# Patient Record
Sex: MALE | Race: WHITE | Employment: UNEMPLOYED | URBAN - METROPOLITAN AREA
[De-identification: names, ages, dates, MRNs, and addresses within clinical notes are randomized per-mention and may not be internally consistent; named-entity substitution may affect disease eponyms.]

---

## 2020-08-30 ENCOUNTER — HOSPITAL ENCOUNTER (EMERGENCY)
Age: 35
Discharge: HOME HEALTH | End: 2020-08-31
Attending: EMERGENCY MEDICINE
Payer: COMMERCIAL

## 2020-08-30 VITALS
BODY MASS INDEX: 23.7 KG/M2 | RESPIRATION RATE: 16 BRPM | SYSTOLIC BLOOD PRESSURE: 135 MMHG | TEMPERATURE: 98.2 F | OXYGEN SATURATION: 95 % | HEIGHT: 72 IN | DIASTOLIC BLOOD PRESSURE: 91 MMHG | WEIGHT: 175 LBS | HEART RATE: 92 BPM

## 2020-08-30 PROCEDURE — 99283 EMERGENCY DEPT VISIT LOW MDM: CPT

## 2020-08-31 NOTE — ED NOTES
Bed: H-02  Expected date:   Expected time:   Means of arrival:   Comments:  EMS     [facility]- [address] [place] [address], RN  08/30/20 4672

## 2020-08-31 NOTE — ED TRIAGE NOTES
Patient arrived in the ED EMS. Patient reportedly overdosed on an unknown substance but was given 4 mg of Narcan total nasally. Patient was reported to be unresponsive when EMS arrived at the scene. After recieveing Narcan woke up immediately. Patient arrived to the ED alert and oriented X3. Patient VS WNL.

## 2020-08-31 NOTE — ED PROVIDER NOTES
had difficulty ambulating. After observation; Patient was discharged in good condition with stable vitals. FINAL IMPRESSION      1. Accidental overdose of heroin, initial encounter [facility])          [address] Discharge - Pending Orders Complete 08/30/2020 11:51:30 PM      PATIENT REFERRED TO:  Shara Hinton MD  [facility]. [address] [place] [address]  [phone]    Call   Call your primary care physician on Monday to discuss emergency department evaluation and treatment strategies to quit using opiates      DISCHARGE MEDICATIONS:  New Prescriptions    No medications on file              (Please note that portions of this note were completed with a voice recognition program.  Efforts were made to edit the dictations but occasionally words are mis-transcribed. )      Jordana Cortes MD (electronically signed)  Attending Emergency Physician          Jordana Cortes MD  08/30/20 3725

## 2020-12-24 ENCOUNTER — HOSPITAL ENCOUNTER (OUTPATIENT)
Age: 35
Setting detail: SPECIMEN
Discharge: HOME | End: 2020-12-24
Payer: COMMERCIAL

## 2020-12-24 PROCEDURE — U0002 COVID-19 LAB TEST NON-CDC: HCPCS

## 2020-12-25 LAB
SARS-COV-2: NOT DETECTED
SOURCE: NORMAL

## 2021-04-09 ENCOUNTER — HOSPITAL ENCOUNTER (OUTPATIENT)
Age: 36
Discharge: HOME | End: 2021-04-09
Payer: COMMERCIAL

## 2021-04-09 LAB
ALBUMIN SERPL-MCNC: 4.8 GM/DL (ref 3.4–5)
ALP BLD-CCNC: 93 IU/L (ref 40–129)
ALT SERPL-CCNC: 57 U/L (ref 10–40)
AST SERPL-CCNC: 31 IU/L (ref 15–37)
BILIRUB SERPL-MCNC: 0.3 MG/DL (ref 0–1)
BILIRUBIN DIRECT: 0.2 MG/DL (ref 0–0.3)
BILIRUBIN, INDIRECT: 0.1 MG/DL (ref 0–0.7)
HEPATITIS B SURFACE ANTIGEN: NON REACTIVE
TOTAL PROTEIN: 7.5 GM/DL (ref 6.4–8.2)

## 2021-04-09 PROCEDURE — 80076 HEPATIC FUNCTION PANEL: CPT

## 2021-04-09 PROCEDURE — 87389 HIV-1 AG W/HIV-1&-2 AB AG IA: CPT

## 2021-04-09 PROCEDURE — 86803 HEPATITIS C AB TEST: CPT

## 2021-04-09 PROCEDURE — 86708 HEPATITIS A ANTIBODY: CPT

## 2021-04-09 PROCEDURE — 36415 COLL VENOUS BLD VENIPUNCTURE: CPT

## 2021-04-09 PROCEDURE — 87340 HEPATITIS B SURFACE AG IA: CPT

## 2021-04-10 LAB
HAV AB SERPL IA-ACNC: POSITIVE
HEPATITIS C ANTIBODY: ABNORMAL

## 2021-04-12 LAB — HIV SCREEN: NON REACTIVE

## 2024-09-05 ENCOUNTER — APPOINTMENT (OUTPATIENT)
Dept: CT IMAGING | Age: 39
End: 2024-09-05
Payer: COMMERCIAL

## 2024-09-05 ENCOUNTER — HOSPITAL ENCOUNTER (EMERGENCY)
Age: 39
Discharge: HOME | End: 2024-09-05
Attending: EMERGENCY MEDICINE
Payer: COMMERCIAL

## 2024-09-05 VITALS
OXYGEN SATURATION: 96 % | SYSTOLIC BLOOD PRESSURE: 134 MMHG | TEMPERATURE: 97.4 F | HEART RATE: 85 BPM | DIASTOLIC BLOOD PRESSURE: 89 MMHG | RESPIRATION RATE: 16 BRPM | BODY MASS INDEX: 33.18 KG/M2 | HEIGHT: 72 IN | WEIGHT: 245 LBS

## 2024-09-05 DIAGNOSIS — R10.9 ABDOMINAL PAIN, UNSPECIFIED ABDOMINAL LOCATION: ICD-10-CM

## 2024-09-05 DIAGNOSIS — K42.9 UMBILICAL HERNIA WITHOUT OBSTRUCTION AND WITHOUT GANGRENE: Primary | ICD-10-CM

## 2024-09-05 LAB
ALBUMIN SERPL-MCNC: 4.7 GM/DL (ref 3.4–5)
ALP BLD-CCNC: 111 IU/L (ref 40–129)
ALT SERPL-CCNC: 56 U/L (ref 10–40)
AMPHETAMINES: NEGATIVE
ANION GAP SERPL CALCULATED.3IONS-SCNC: 18 MMOL/L (ref 7–16)
AST SERPL-CCNC: 49 IU/L (ref 15–37)
BARBITURATE SCREEN URINE: NEGATIVE
BASOPHILS ABSOLUTE: 0.1 K/CU MM
BASOPHILS RELATIVE PERCENT: 0.6 % (ref 0–1)
BENZODIAZEPINE SCREEN, URINE: NEGATIVE
BILIRUB SERPL-MCNC: 0.2 MG/DL (ref 0–1)
BILIRUBIN, URINE: NEGATIVE MG/DL
BLOOD, URINE: NEGATIVE
BUN SERPL-MCNC: 8 MG/DL (ref 6–23)
CALCIUM SERPL-MCNC: 9.8 MG/DL (ref 8.3–10.6)
CANNABINOID SCREEN URINE: ABNORMAL
CHLORIDE BLD-SCNC: 99 MMOL/L (ref 99–110)
CLARITY, UA: CLEAR
CO2: 23 MMOL/L (ref 21–32)
COCAINE METABOLITE: NEGATIVE
COLOR, UA: YELLOW
COMMENT UA: ABNORMAL
CREAT SERPL-MCNC: 0.8 MG/DL (ref 0.9–1.3)
DIFFERENTIAL TYPE: ABNORMAL
EOSINOPHILS ABSOLUTE: 0.2 K/CU MM
EOSINOPHILS RELATIVE PERCENT: 2.5 % (ref 0–3)
FENTANYL URINE: NEGATIVE
GFR, ESTIMATED: >90 ML/MIN/1.73M2
GLUCOSE SERPL-MCNC: 103 MG/DL (ref 70–99)
GLUCOSE URINE: NEGATIVE MG/DL
HCT VFR BLD CALC: 43 % (ref 42–52)
HEMOGLOBIN: 14.9 GM/DL (ref 13.5–18)
IMMATURE NEUTROPHIL %: 0.3 % (ref 0–0.43)
KETONES, URINE: ABNORMAL MG/DL
LEUKOCYTE ESTERASE, URINE: NEGATIVE
LIPASE: 16 IU/L (ref 13–60)
LYMPHOCYTES ABSOLUTE: 2.9 K/CU MM
LYMPHOCYTES RELATIVE PERCENT: 32.6 % (ref 24–44)
MCH RBC QN AUTO: 31.9 PG (ref 27–31)
MCHC RBC AUTO-ENTMCNC: 34.7 % (ref 32–36)
MCV RBC AUTO: 92.1 FL (ref 78–100)
MONOCYTES ABSOLUTE: 0.7 K/CU MM
MONOCYTES RELATIVE PERCENT: 7.6 % (ref 0–4)
NEUTROPHILS ABSOLUTE: 4.9 K/CU MM
NEUTROPHILS RELATIVE PERCENT: 56.4 % (ref 36–66)
NITRITE URINE, QUANTITATIVE: NEGATIVE
OPIATES, URINE: NEGATIVE
OXYCODONE: NEGATIVE
PDW BLD-RTO: 13.2 % (ref 11.7–14.9)
PH, URINE: 6.5 (ref 5–8)
PLATELET # BLD: 218 K/CU MM (ref 140–440)
PMV BLD AUTO: 11 FL (ref 7.5–11.1)
POTASSIUM SERPL-SCNC: 4.3 MMOL/L (ref 3.5–5.1)
PROTEIN UA: NEGATIVE MG/DL
RBC # BLD: 4.67 M/CU MM (ref 4.6–6.2)
SODIUM BLD-SCNC: 140 MMOL/L (ref 135–145)
SPECIFIC GRAVITY UA: 1.02 (ref 1–1.03)
TOTAL IMMATURE NEUTOROPHIL: 0.03 K/CU MM
TOTAL PROTEIN: 7.8 GM/DL (ref 6.4–8.2)
UROBILINOGEN, URINE: 0.2 MG/DL (ref 0.2–1)
WBC # BLD: 8.7 K/CU MM (ref 4–10.5)

## 2024-09-05 PROCEDURE — 81003 URINALYSIS AUTO W/O SCOPE: CPT

## 2024-09-05 PROCEDURE — 6360000004 HC RX CONTRAST MEDICATION: Performed by: EMERGENCY MEDICINE

## 2024-09-05 PROCEDURE — 80053 COMPREHEN METABOLIC PANEL: CPT

## 2024-09-05 PROCEDURE — 80307 DRUG TEST PRSMV CHEM ANLYZR: CPT

## 2024-09-05 PROCEDURE — 74177 CT ABD & PELVIS W/CONTRAST: CPT

## 2024-09-05 PROCEDURE — 6370000000 HC RX 637 (ALT 250 FOR IP): Performed by: EMERGENCY MEDICINE

## 2024-09-05 PROCEDURE — 85025 COMPLETE CBC W/AUTO DIFF WBC: CPT

## 2024-09-05 PROCEDURE — 83690 ASSAY OF LIPASE: CPT

## 2024-09-05 PROCEDURE — 99285 EMERGENCY DEPT VISIT HI MDM: CPT

## 2024-09-05 PROCEDURE — 6360000002 HC RX W HCPCS: Performed by: EMERGENCY MEDICINE

## 2024-09-05 PROCEDURE — 2580000003 HC RX 258: Performed by: EMERGENCY MEDICINE

## 2024-09-05 PROCEDURE — 96374 THER/PROPH/DIAG INJ IV PUSH: CPT

## 2024-09-05 RX ORDER — METHADONE HYDROCHLORIDE 40 MG/1
40 TABLET ORAL EVERY 4 HOURS PRN
COMMUNITY

## 2024-09-05 RX ORDER — 0.9 % SODIUM CHLORIDE 0.9 %
1000 INTRAVENOUS SOLUTION INTRAVENOUS ONCE
Status: COMPLETED | OUTPATIENT
Start: 2024-09-05 | End: 2024-09-05

## 2024-09-05 RX ORDER — ONDANSETRON 2 MG/ML
4 INJECTION INTRAMUSCULAR; INTRAVENOUS EVERY 30 MIN PRN
Status: DISCONTINUED | OUTPATIENT
Start: 2024-09-05 | End: 2024-09-05 | Stop reason: HOSPADM

## 2024-09-05 RX ORDER — KETOROLAC TROMETHAMINE 30 MG/ML
30 INJECTION, SOLUTION INTRAMUSCULAR; INTRAVENOUS ONCE
Status: COMPLETED | OUTPATIENT
Start: 2024-09-05 | End: 2024-09-05

## 2024-09-05 RX ORDER — ONDANSETRON 4 MG/1
4 TABLET, ORALLY DISINTEGRATING ORAL 3 TIMES DAILY PRN
Qty: 21 TABLET | Refills: 0 | Status: SHIPPED | OUTPATIENT
Start: 2024-09-05

## 2024-09-05 RX ORDER — DICYCLOMINE HCL 20 MG
20 TABLET ORAL 4 TIMES DAILY
Qty: 40 TABLET | Refills: 0 | Status: SHIPPED | OUTPATIENT
Start: 2024-09-05

## 2024-09-05 RX ORDER — DICYCLOMINE HCL 20 MG
20 TABLET ORAL ONCE
Status: COMPLETED | OUTPATIENT
Start: 2024-09-05 | End: 2024-09-05

## 2024-09-05 RX ORDER — IOPAMIDOL 755 MG/ML
75 INJECTION, SOLUTION INTRAVASCULAR
Status: COMPLETED | OUTPATIENT
Start: 2024-09-05 | End: 2024-09-05

## 2024-09-05 RX ORDER — ACETAMINOPHEN 325 MG/1
650 TABLET ORAL EVERY 6 HOURS PRN
Qty: 120 TABLET | Refills: 3 | Status: SHIPPED | OUTPATIENT
Start: 2024-09-05

## 2024-09-05 RX ADMIN — IOPAMIDOL 75 ML: 755 INJECTION, SOLUTION INTRAVENOUS at 20:39

## 2024-09-05 RX ADMIN — DICYCLOMINE HYDROCHLORIDE 20 MG: 20 TABLET ORAL at 19:50

## 2024-09-05 RX ADMIN — KETOROLAC TROMETHAMINE 30 MG: 30 INJECTION, SOLUTION INTRAMUSCULAR; INTRAVENOUS at 19:48

## 2024-09-05 RX ADMIN — SODIUM CHLORIDE 1000 ML: 9 INJECTION, SOLUTION INTRAVENOUS at 19:48

## 2024-09-05 ASSESSMENT — PAIN DESCRIPTION - LOCATION
LOCATION: ABDOMEN

## 2024-09-05 ASSESSMENT — PAIN DESCRIPTION - PAIN TYPE
TYPE: ACUTE PAIN

## 2024-09-05 ASSESSMENT — PAIN SCALES - GENERAL
PAINLEVEL_OUTOF10: 2
PAINLEVEL_OUTOF10: 6
PAINLEVEL_OUTOF10: 6

## 2024-09-05 ASSESSMENT — ENCOUNTER SYMPTOMS
EYES NEGATIVE: 1
ALLERGIC/IMMUNOLOGIC NEGATIVE: 1
ABDOMINAL PAIN: 1
NAUSEA: 1
RESPIRATORY NEGATIVE: 1

## 2024-09-05 ASSESSMENT — PAIN - FUNCTIONAL ASSESSMENT
PAIN_FUNCTIONAL_ASSESSMENT: ACTIVITIES ARE NOT PREVENTED
PAIN_FUNCTIONAL_ASSESSMENT: 0-10
PAIN_FUNCTIONAL_ASSESSMENT: ACTIVITIES ARE NOT PREVENTED
PAIN_FUNCTIONAL_ASSESSMENT: ACTIVITIES ARE NOT PREVENTED

## 2024-09-05 ASSESSMENT — PAIN DESCRIPTION - ORIENTATION
ORIENTATION: MID

## 2024-09-05 ASSESSMENT — PAIN DESCRIPTION - FREQUENCY
FREQUENCY: CONTINUOUS

## 2024-09-05 ASSESSMENT — PAIN DESCRIPTION - DESCRIPTORS
DESCRIPTORS: ACHING;SHARP

## 2024-09-05 NOTE — ED PROVIDER NOTES
[facility]      TRIAGE CHIEF COMPLAINT:   Abdominal Pain (Pt states that he has had mid abdominal pain for for past 2-3 days. States that he was lifting something and now has a bubble near his belly button. )      [place]:  Rafy Castle is a 39 y.o. male that presents with complaint of periumbilical abdominal pain and possible umbilical hernia.  Patient states that he has had this for last 2 3 days after lifting something.  He thinks he has a hernia.  He was told back in 2018 in custodial that he had a umbilical hernia he states since the last 2 days has gotten worse after he tried lifting something.  No fevers some slight nausea no vomiting no chest pain shortness of breath no urine complaints of diarrhea constipation.  He does use marijuana does use methadone.  No other questions..    REVIEW OF SYSTEMS:  At least 10 systems reviewed and otherwise acutely negative except as in the [place].    Review of Systems   Constitutional: Negative.    HENT: Negative.     Eyes: Negative.    Respiratory: Negative.     Cardiovascular: Negative.    Gastrointestinal:  Positive for abdominal pain and nausea.   Endocrine: Negative.    Genitourinary: Negative.    Musculoskeletal: Negative.    Skin: Negative.    Allergic/Immunologic: Negative.    Neurological: Negative.    Hematological: Negative.    Psychiatric/Behavioral: Negative.     All other systems reviewed and are negative.      Past Medical History:   Diagnosis Date    Anxiety     Asthma     Chronic back pain     Migraine      Past Surgical History:   Procedure Laterality Date    WISDOM TOOTH EXTRACTION       History reviewed. No pertinent family history.  Social History     Socioeconomic History    Marital status: Single     Spouse name: Not on file    Number of children: Not on file    Years of education: Not on file    Highest education level: Not on file   Occupational History    Not on file   Tobacco Use    Smoking status: Former     Current

## 2024-09-11 ENCOUNTER — OFFICE VISIT (OUTPATIENT)
Dept: SURGERY | Age: 39
End: 2024-09-11

## 2024-09-11 VITALS
BODY MASS INDEX: 34.58 KG/M2 | WEIGHT: 255.3 LBS | OXYGEN SATURATION: 95 % | DIASTOLIC BLOOD PRESSURE: 88 MMHG | HEART RATE: 92 BPM | HEIGHT: 72 IN | SYSTOLIC BLOOD PRESSURE: 122 MMHG

## 2024-09-11 DIAGNOSIS — N64.4 BREAST PAIN: Primary | ICD-10-CM

## 2024-09-11 DIAGNOSIS — K42.9 UMBILICAL HERNIA WITHOUT OBSTRUCTION AND WITHOUT GANGRENE: ICD-10-CM

## 2024-10-01 ENCOUNTER — APPOINTMENT (OUTPATIENT)
Dept: ULTRASOUND IMAGING | Age: 39
End: 2024-10-01
Attending: SURGERY
Payer: COMMERCIAL

## 2024-10-01 ENCOUNTER — HOSPITAL ENCOUNTER (OUTPATIENT)
Dept: WOMENS IMAGING | Age: 39
Discharge: HOME | End: 2024-10-01
Attending: SURGERY
Payer: COMMERCIAL

## 2024-10-01 VITALS — BODY MASS INDEX: 33.86 KG/M2 | HEIGHT: 72 IN | WEIGHT: 250 LBS

## 2024-10-01 DIAGNOSIS — N64.4 BREAST PAIN: ICD-10-CM

## 2024-10-01 DIAGNOSIS — N64.4 BREAST PAIN: Primary | ICD-10-CM

## 2024-10-01 PROCEDURE — 77066 DX MAMMO INCL CAD BI: CPT

## 2024-10-09 ENCOUNTER — OFFICE VISIT (OUTPATIENT)
Dept: SURGERY | Age: 39
End: 2024-10-09

## 2024-10-09 VITALS
BODY MASS INDEX: 33.86 KG/M2 | HEART RATE: 82 BPM | DIASTOLIC BLOOD PRESSURE: 78 MMHG | WEIGHT: 250 LBS | SYSTOLIC BLOOD PRESSURE: 126 MMHG | HEIGHT: 72 IN

## 2024-10-09 DIAGNOSIS — K42.9 UMBILICAL HERNIA WITHOUT OBSTRUCTION AND WITHOUT GANGRENE: ICD-10-CM

## 2024-10-09 DIAGNOSIS — N62 GYNECOMASTIA: Primary | ICD-10-CM

## 2024-10-09 NOTE — PROGRESS NOTES
tobacco: Not on file   Vaping Use    Vaping status: Never Used   Substance and Sexual Activity    Alcohol use: No    Drug use: Yes     Types: Marijuana (Weed)     Comment: daily    Sexual activity: Yes   Other Topics Concern    Not on file   Social History Narrative    Not on file     Social Determinants of Health     Financial Resource Strain: Not on file   Food Insecurity: Not on file   Transportation Needs: Not on file   Physical Activity: Not on file   Stress: Not on file   Social Connections: Not on file   Intimate Partner Violence: Not on file   Housing Stability: Not on file       OBJECTIVE:    General: A&O x3  Respiratory: Chest rise equal bilaterally  CV: Regular rate and rhythm  Abdomen: Soft, nontender, nondistended, no rebound or guarding.  Umbilical hernia        ASSESSMENT:    1. Gynecomastia    2. Umbilical hernia without obstruction and without gangrene          PLAN:    Imaging reviewed in regards to gynecomastia.  Patient does not want a pursue surgery at this time.    Umbilical hernia-discussed risks and benefits and alternatives to repair and he would like to proceed with robotic repair.  He has a follow-up appointment scheduled as he would like to pursue surgery later in the year.        No orders of the defined types were placed in this encounter.       No orders of the defined types were placed in this encounter.       Follow Up: No follow-ups on file.    Shar Ho,

## 2024-11-27 ENCOUNTER — OFFICE VISIT (OUTPATIENT)
Dept: SURGERY | Age: 39
End: 2024-11-27
Payer: COMMERCIAL

## 2024-11-27 VITALS — SYSTOLIC BLOOD PRESSURE: 136 MMHG | HEART RATE: 84 BPM | OXYGEN SATURATION: 96 % | DIASTOLIC BLOOD PRESSURE: 84 MMHG

## 2024-11-27 DIAGNOSIS — K42.0 UMBILICAL HERNIA WITH OBSTRUCTION, WITHOUT GANGRENE: Primary | ICD-10-CM

## 2024-11-27 PROCEDURE — G8417 CALC BMI ABV UP PARAM F/U: HCPCS | Performed by: SURGERY

## 2024-11-27 PROCEDURE — 1036F TOBACCO NON-USER: CPT | Performed by: SURGERY

## 2024-11-27 PROCEDURE — G8427 DOCREV CUR MEDS BY ELIG CLIN: HCPCS | Performed by: SURGERY

## 2024-11-27 PROCEDURE — 99214 OFFICE O/P EST MOD 30 MIN: CPT | Performed by: SURGERY

## 2024-11-27 PROCEDURE — G8484 FLU IMMUNIZE NO ADMIN: HCPCS | Performed by: SURGERY

## 2024-11-27 RX ORDER — SODIUM CHLORIDE 0.9 % (FLUSH) 0.9 %
5-40 SYRINGE (ML) INJECTION EVERY 12 HOURS SCHEDULED
OUTPATIENT
Start: 2024-11-27

## 2024-11-27 RX ORDER — SODIUM CHLORIDE 9 MG/ML
INJECTION, SOLUTION INTRAVENOUS PRN
OUTPATIENT
Start: 2024-11-27

## 2024-11-27 RX ORDER — SODIUM CHLORIDE 9 MG/ML
INJECTION, SOLUTION INTRAVENOUS CONTINUOUS
OUTPATIENT
Start: 2024-11-27

## 2024-11-27 RX ORDER — SODIUM CHLORIDE 0.9 % (FLUSH) 0.9 %
5-40 SYRINGE (ML) INJECTION PRN
OUTPATIENT
Start: 2024-11-27

## 2024-11-27 NOTE — PROGRESS NOTES
repair with mesh.    Orders Placed This Encounter   Procedures    Initiate PAT Protocol        No orders of the defined types were placed in this encounter.       Follow Up:  No follow-ups on file.      Shar Ho DO

## 2024-12-04 ENCOUNTER — PREP FOR PROCEDURE (OUTPATIENT)
Dept: SURGERY | Age: 39
End: 2024-12-04

## 2024-12-04 DIAGNOSIS — K42.0 UMBILICAL HERNIA WITH OBSTRUCTION, WITHOUT GANGRENE: ICD-10-CM

## 2024-12-05 ENCOUNTER — TELEPHONE (OUTPATIENT)
Dept: SURGERY | Age: 39
End: 2024-12-05

## 2024-12-05 NOTE — TELEPHONE ENCOUNTER
SPOKE TO Rafy Castle REGARDING SURGERY (ROBOTIC UMBILICAL HERNIA) SCHEDULED @ [facility]. NOTIFIED OF DATES, TIMES AND LOCATION    PHONE ASSESSMENT   SURGERY -1/2 @ 7:30  P/O - 1/15 @ 9    NPO AFTER MIDNIGHT    HOLD BLOOD THINNERS - NA

## 2024-12-19 NOTE — PROGRESS NOTES
Surgery @ [facility] on 1/3/2025 you will be called 12/31/24 with times    NOTHING TO EAT OR DRINK AFTER MIDNIGHT DAY OF SURGERY    1. Enter thru the hospital main entrance on day of surgery, check in at the Information Desk. If you arrive prior to 6:00am, enter thru the ER entrance.    2. Follow the directions as prescribed by the doctor for your procedure and medications.         Morning of surgery take: No medications          Stop vitamins, supplements and NSAIDS:  12/26/24    3. Check with your Doctor regarding stopping blood thinners and follow their instructions.    4. Do not smoke, vape or use chewing tobacco morning of surgery. Do not drink any alcoholic beverages 24 hours prior to surgery.       This includes NA Beer. No street drugs 7 days prior to surgery.    5. If you have dentures, contacts of glasses they will be removed before going to the OR; please bring a case.    6. Please bring picture ID, insurance card, paperwork from the doctor’s office (H & P, Consent, & card for implantable devices).    7. Take a shower with an antibacterial soap the night before surgery and the morning of surgery. Do not put anything on your skin      After your morning shower.    8. You will need a responsible adult to drive you home and check on you after surgery.

## 2024-12-31 ENCOUNTER — ANESTHESIA EVENT (OUTPATIENT)
Dept: OPERATING ROOM | Age: 39
End: 2024-12-31
Payer: COMMERCIAL

## 2024-12-31 NOTE — PROGRESS NOTES
Notified patient surgery @ [facility] on 1/2/25 @ 0730, arrival 0600. NPO status and morning medications reviewed. Understanding verbalized.

## 2024-12-31 NOTE — ANESTHESIA PRE PROCEDURE
Department of Anesthesiology  Preprocedure Note       Name:  Rafy Castle   Age:  39 y.o.  :  1985                                          MRN:  [id number]         Date:  2024      Surgeon: Surgeon(s):  Shar Ho DO    Procedure: Procedure(s):  HERNIA UMBILICAL REPAIR LAPAROSCOPIC ROBOTIC    Medications prior to admission:   Prior to Admission medications    Medication Sig Start Date End Date Taking? Authorizing Provider   methadone (METHADOSE) 40 MG disintegrating tablet Take 1 tablet by mouth every 4 hours as needed for Pain.   Yes Provider, MD Rochelle   dicyclomine (BENTYL) 20 MG tablet Take 1 tablet by mouth 4 times daily 24  Yes Hossein Alexander DO   acetaminophen (TYLENOL) 325 MG tablet Take 2 tablets by mouth every 6 hours as needed for Pain 24  Yes Hossein Alexander DO   ondansetron (ZOFRAN-ODT) 4 MG disintegrating tablet Take 1 tablet by mouth 3 times daily as needed for Nausea or Vomiting  Patient not taking: Reported on 2024   Hossein Alexander DO   naproxen (NAPROSYN) 500 MG tablet Take 1 tablet by mouth 2 times daily as needed for Pain 17  Geoff Fields PA-C   acetaminophen (APAP EXTRA STRENGTH) 500 MG tablet Take 1 tablet by mouth every 6 hours as needed for Pain  Patient not taking: Reported on 2024   Geoff Fields PA-C       Current medications:    No current facility-administered medications for this encounter.     Current Outpatient Medications   Medication Sig Dispense Refill   • methadone (METHADOSE) 40 MG disintegrating tablet Take 1 tablet by mouth every 4 hours as needed for Pain.     • dicyclomine (BENTYL) 20 MG tablet Take 1 tablet by mouth 4 times daily 40 tablet 0   • acetaminophen (TYLENOL) 325 MG tablet Take 2 tablets by mouth every 6 hours as needed for Pain 120 tablet 3   • ondansetron (ZOFRAN-ODT) 4 MG disintegrating tablet Take 1 tablet by mouth 3 times daily as needed for Nausea or Vomiting

## 2025-01-02 ENCOUNTER — ANESTHESIA (OUTPATIENT)
Dept: OPERATING ROOM | Age: 40
End: 2025-01-02
Payer: COMMERCIAL

## 2025-01-02 ENCOUNTER — HOSPITAL ENCOUNTER (OUTPATIENT)
Age: 40
Setting detail: OUTPATIENT SURGERY
Discharge: HOME | End: 2025-01-02
Attending: SURGERY | Admitting: SURGERY
Payer: COMMERCIAL

## 2025-01-02 VITALS
BODY MASS INDEX: 33.86 KG/M2 | OXYGEN SATURATION: 92 % | WEIGHT: 250 LBS | DIASTOLIC BLOOD PRESSURE: 77 MMHG | HEIGHT: 72 IN | SYSTOLIC BLOOD PRESSURE: 120 MMHG | HEART RATE: 72 BPM | TEMPERATURE: 98 F | RESPIRATION RATE: 16 BRPM

## 2025-01-02 DIAGNOSIS — K42.0 UMBILICAL HERNIA WITH OBSTRUCTION, WITHOUT GANGRENE: Primary | ICD-10-CM

## 2025-01-02 PROCEDURE — 99024 POSTOP FOLLOW-UP VISIT: CPT | Performed by: SURGERY

## 2025-01-02 PROCEDURE — 6360000002 HC RX W HCPCS: Performed by: ANESTHESIOLOGY

## 2025-01-02 PROCEDURE — 3600000019 HC SURGERY ROBOT ADDTL 15MIN: Performed by: SURGERY

## 2025-01-02 PROCEDURE — 7100000010 HC PHASE II RECOVERY - FIRST 15 MIN: Performed by: SURGERY

## 2025-01-02 PROCEDURE — 49594 RPR AA HRN 1ST 3-10 NCR/STRN: CPT | Performed by: SURGERY

## 2025-01-02 PROCEDURE — 6370000000 HC RX 637 (ALT 250 FOR IP): Performed by: ANESTHESIOLOGY

## 2025-01-02 PROCEDURE — 6360000002 HC RX W HCPCS

## 2025-01-02 PROCEDURE — 2500000003 HC RX 250 WO HCPCS: Performed by: SURGERY

## 2025-01-02 PROCEDURE — 6360000002 HC RX W HCPCS: Performed by: SURGERY

## 2025-01-02 PROCEDURE — 3700000001 HC ADD 15 MINUTES (ANESTHESIA): Performed by: SURGERY

## 2025-01-02 PROCEDURE — C1781 MESH (IMPLANTABLE): HCPCS | Performed by: SURGERY

## 2025-01-02 PROCEDURE — 3600000009 HC SURGERY ROBOT BASE: Performed by: SURGERY

## 2025-01-02 PROCEDURE — 2580000003 HC RX 258

## 2025-01-02 PROCEDURE — 2500000003 HC RX 250 WO HCPCS

## 2025-01-02 PROCEDURE — 7100000001 HC PACU RECOVERY - ADDTL 15 MIN: Performed by: SURGERY

## 2025-01-02 PROCEDURE — 7100000011 HC PHASE II RECOVERY - ADDTL 15 MIN: Performed by: SURGERY

## 2025-01-02 PROCEDURE — 7100000000 HC PACU RECOVERY - FIRST 15 MIN: Performed by: SURGERY

## 2025-01-02 PROCEDURE — 2709999900 HC NON-CHARGEABLE SUPPLY: Performed by: SURGERY

## 2025-01-02 PROCEDURE — 3700000000 HC ANESTHESIA ATTENDED CARE: Performed by: SURGERY

## 2025-01-02 DEVICE — MESH HERN W15XH15CM POLYPR NONABSORBABLE SYN SQ PROL: Type: IMPLANTABLE DEVICE | Site: ABDOMEN | Status: FUNCTIONAL

## 2025-01-02 RX ORDER — LABETALOL HYDROCHLORIDE 5 MG/ML
10 INJECTION, SOLUTION INTRAVENOUS
Status: DISCONTINUED | OUTPATIENT
Start: 2025-01-02 | End: 2025-01-02 | Stop reason: HOSPADM

## 2025-01-02 RX ORDER — METHOCARBAMOL 100 MG/ML
500 INJECTION, SOLUTION INTRAMUSCULAR; INTRAVENOUS EVERY 8 HOURS
Status: DISCONTINUED | OUTPATIENT
Start: 2025-01-02 | End: 2025-01-02 | Stop reason: HOSPADM

## 2025-01-02 RX ORDER — SODIUM CHLORIDE 0.9 % (FLUSH) 0.9 %
5-40 SYRINGE (ML) INJECTION EVERY 12 HOURS SCHEDULED
Status: DISCONTINUED | OUTPATIENT
Start: 2025-01-02 | End: 2025-01-02 | Stop reason: HOSPADM

## 2025-01-02 RX ORDER — HYDROCODONE BITARTRATE AND ACETAMINOPHEN 5; 325 MG/1; MG/1
1 TABLET ORAL EVERY 6 HOURS PRN
Qty: 20 TABLET | Refills: 0 | Status: SHIPPED | OUTPATIENT
Start: 2025-01-02 | End: 2025-01-07

## 2025-01-02 RX ORDER — KETAMINE HCL 50MG/ML(1)
SYRINGE (ML) INTRAVENOUS
Status: DISCONTINUED | OUTPATIENT
Start: 2025-01-02 | End: 2025-01-02 | Stop reason: SDUPTHER

## 2025-01-02 RX ORDER — SODIUM CHLORIDE 0.9 % (FLUSH) 0.9 %
5-40 SYRINGE (ML) INJECTION PRN
Status: CANCELLED | OUTPATIENT
Start: 2025-01-02

## 2025-01-02 RX ORDER — KETOROLAC TROMETHAMINE 30 MG/ML
INJECTION, SOLUTION INTRAMUSCULAR; INTRAVENOUS
Status: DISCONTINUED | OUTPATIENT
Start: 2025-01-02 | End: 2025-01-02 | Stop reason: SDUPTHER

## 2025-01-02 RX ORDER — SODIUM CHLORIDE 0.9 % (FLUSH) 0.9 %
5-40 SYRINGE (ML) INJECTION PRN
Status: DISCONTINUED | OUTPATIENT
Start: 2025-01-02 | End: 2025-01-02 | Stop reason: HOSPADM

## 2025-01-02 RX ORDER — SODIUM CHLORIDE 0.9 % (FLUSH) 0.9 %
5-40 SYRINGE (ML) INJECTION EVERY 12 HOURS SCHEDULED
Status: CANCELLED | OUTPATIENT
Start: 2025-01-02

## 2025-01-02 RX ORDER — BUPIVACAINE HYDROCHLORIDE 5 MG/ML
INJECTION, SOLUTION EPIDURAL; INTRACAUDAL
Status: COMPLETED | OUTPATIENT
Start: 2025-01-02 | End: 2025-01-02

## 2025-01-02 RX ORDER — FENTANYL CITRATE 50 UG/ML
50 INJECTION, SOLUTION INTRAMUSCULAR; INTRAVENOUS EVERY 5 MIN PRN
Status: DISCONTINUED | OUTPATIENT
Start: 2025-01-02 | End: 2025-01-02 | Stop reason: HOSPADM

## 2025-01-02 RX ORDER — ONDANSETRON 2 MG/ML
INJECTION INTRAMUSCULAR; INTRAVENOUS
Status: DISCONTINUED | OUTPATIENT
Start: 2025-01-02 | End: 2025-01-02 | Stop reason: SDUPTHER

## 2025-01-02 RX ORDER — SODIUM CHLORIDE 9 MG/ML
INJECTION, SOLUTION INTRAVENOUS PRN
Status: CANCELLED | OUTPATIENT
Start: 2025-01-02

## 2025-01-02 RX ORDER — LIDOCAINE HYDROCHLORIDE 20 MG/ML
INJECTION, SOLUTION INTRAVENOUS
Status: DISCONTINUED | OUTPATIENT
Start: 2025-01-02 | End: 2025-01-02 | Stop reason: SDUPTHER

## 2025-01-02 RX ORDER — ROCURONIUM BROMIDE 10 MG/ML
INJECTION, SOLUTION INTRAVENOUS
Status: DISCONTINUED | OUTPATIENT
Start: 2025-01-02 | End: 2025-01-02 | Stop reason: SDUPTHER

## 2025-01-02 RX ORDER — HYDRALAZINE HYDROCHLORIDE 20 MG/ML
10 INJECTION INTRAMUSCULAR; INTRAVENOUS
Status: DISCONTINUED | OUTPATIENT
Start: 2025-01-02 | End: 2025-01-02 | Stop reason: HOSPADM

## 2025-01-02 RX ORDER — PROPOFOL 10 MG/ML
INJECTION, EMULSION INTRAVENOUS
Status: DISCONTINUED | OUTPATIENT
Start: 2025-01-02 | End: 2025-01-02 | Stop reason: SDUPTHER

## 2025-01-02 RX ORDER — NALOXONE HYDROCHLORIDE 0.4 MG/ML
INJECTION, SOLUTION INTRAMUSCULAR; INTRAVENOUS; SUBCUTANEOUS PRN
Status: DISCONTINUED | OUTPATIENT
Start: 2025-01-02 | End: 2025-01-02 | Stop reason: HOSPADM

## 2025-01-02 RX ORDER — DEXAMETHASONE SODIUM PHOSPHATE 4 MG/ML
INJECTION, SOLUTION INTRA-ARTICULAR; INTRALESIONAL; INTRAMUSCULAR; INTRAVENOUS; SOFT TISSUE
Status: DISCONTINUED | OUTPATIENT
Start: 2025-01-02 | End: 2025-01-02 | Stop reason: SDUPTHER

## 2025-01-02 RX ORDER — ACETAMINOPHEN 500 MG
1000 TABLET ORAL ONCE
Status: COMPLETED | OUTPATIENT
Start: 2025-01-02 | End: 2025-01-02

## 2025-01-02 RX ORDER — SODIUM CHLORIDE, SODIUM LACTATE, POTASSIUM CHLORIDE, CALCIUM CHLORIDE 600; 310; 30; 20 MG/100ML; MG/100ML; MG/100ML; MG/100ML
INJECTION, SOLUTION INTRAVENOUS
Status: DISCONTINUED | OUTPATIENT
Start: 2025-01-02 | End: 2025-01-02 | Stop reason: SDUPTHER

## 2025-01-02 RX ORDER — ONDANSETRON 4 MG/1
4 TABLET, ORALLY DISINTEGRATING ORAL 3 TIMES DAILY PRN
Qty: 21 TABLET | Refills: 0 | Status: SHIPPED | OUTPATIENT
Start: 2025-01-02

## 2025-01-02 RX ORDER — LORAZEPAM 2 MG/ML
0.5 INJECTION INTRAMUSCULAR
Status: DISCONTINUED | OUTPATIENT
Start: 2025-01-02 | End: 2025-01-02 | Stop reason: HOSPADM

## 2025-01-02 RX ORDER — KETOROLAC TROMETHAMINE 30 MG/ML
30 INJECTION, SOLUTION INTRAMUSCULAR; INTRAVENOUS ONCE
Status: COMPLETED | OUTPATIENT
Start: 2025-01-02 | End: 2025-01-02

## 2025-01-02 RX ORDER — ONDANSETRON 2 MG/ML
4 INJECTION INTRAMUSCULAR; INTRAVENOUS
Status: COMPLETED | OUTPATIENT
Start: 2025-01-02 | End: 2025-01-02

## 2025-01-02 RX ORDER — DROPERIDOL 2.5 MG/ML
0.62 INJECTION, SOLUTION INTRAMUSCULAR; INTRAVENOUS
Status: DISCONTINUED | OUTPATIENT
Start: 2025-01-02 | End: 2025-01-02 | Stop reason: HOSPADM

## 2025-01-02 RX ORDER — OXYCODONE HYDROCHLORIDE 5 MG/1
5 TABLET ORAL
Status: COMPLETED | OUTPATIENT
Start: 2025-01-02 | End: 2025-01-02

## 2025-01-02 RX ORDER — MIDAZOLAM HYDROCHLORIDE 1 MG/ML
INJECTION, SOLUTION INTRAMUSCULAR; INTRAVENOUS
Status: DISCONTINUED | OUTPATIENT
Start: 2025-01-02 | End: 2025-01-02 | Stop reason: SDUPTHER

## 2025-01-02 RX ORDER — DEXMEDETOMIDINE HYDROCHLORIDE 100 UG/ML
INJECTION, SOLUTION INTRAVENOUS
Status: DISCONTINUED | OUTPATIENT
Start: 2025-01-02 | End: 2025-01-02 | Stop reason: SDUPTHER

## 2025-01-02 RX ORDER — SODIUM CHLORIDE 9 MG/ML
INJECTION, SOLUTION INTRAVENOUS CONTINUOUS
Status: DISCONTINUED | OUTPATIENT
Start: 2025-01-02 | End: 2025-01-02 | Stop reason: HOSPADM

## 2025-01-02 RX ORDER — SODIUM CHLORIDE 9 MG/ML
INJECTION, SOLUTION INTRAVENOUS PRN
Status: DISCONTINUED | OUTPATIENT
Start: 2025-01-02 | End: 2025-01-02 | Stop reason: HOSPADM

## 2025-01-02 RX ORDER — MEPERIDINE HYDROCHLORIDE 25 MG/ML
12.5 INJECTION INTRAMUSCULAR; INTRAVENOUS; SUBCUTANEOUS EVERY 5 MIN PRN
Status: DISCONTINUED | OUTPATIENT
Start: 2025-01-02 | End: 2025-01-02 | Stop reason: HOSPADM

## 2025-01-02 RX ADMIN — DEXAMETHASONE SODIUM PHOSPHATE 8 MG: 4 INJECTION, SOLUTION INTRAMUSCULAR; INTRAVENOUS at 07:58

## 2025-01-02 RX ADMIN — Medication 25 MG: at 07:49

## 2025-01-02 RX ADMIN — OXYCODONE HYDROCHLORIDE 5 MG: 5 TABLET ORAL at 10:49

## 2025-01-02 RX ADMIN — DEXMEDETOMIDINE 10 MCG: 100 INJECTION, SOLUTION INTRAVENOUS at 08:14

## 2025-01-02 RX ADMIN — ROCURONIUM BROMIDE 10 MG: 10 INJECTION, SOLUTION INTRAVENOUS at 08:35

## 2025-01-02 RX ADMIN — Medication 25 MG: at 07:59

## 2025-01-02 RX ADMIN — ONDANSETRON 4 MG: 2 INJECTION INTRAMUSCULAR; INTRAVENOUS at 07:58

## 2025-01-02 RX ADMIN — KETOROLAC TROMETHAMINE 30 MG: 30 INJECTION, SOLUTION INTRAMUSCULAR; INTRAVENOUS at 10:07

## 2025-01-02 RX ADMIN — ROCURONIUM BROMIDE 20 MG: 10 INJECTION, SOLUTION INTRAVENOUS at 08:11

## 2025-01-02 RX ADMIN — DEXMEDETOMIDINE 10 MCG: 100 INJECTION, SOLUTION INTRAVENOUS at 08:35

## 2025-01-02 RX ADMIN — HYDROMORPHONE HYDROCHLORIDE 0.5 MG: 1 INJECTION, SOLUTION INTRAMUSCULAR; INTRAVENOUS; SUBCUTANEOUS at 10:21

## 2025-01-02 RX ADMIN — SODIUM CHLORIDE, POTASSIUM CHLORIDE, SODIUM LACTATE AND CALCIUM CHLORIDE: 600; 310; 30; 20 INJECTION, SOLUTION INTRAVENOUS at 07:45

## 2025-01-02 RX ADMIN — DEXMEDETOMIDINE 10 MCG: 100 INJECTION, SOLUTION INTRAVENOUS at 09:20

## 2025-01-02 RX ADMIN — SODIUM CHLORIDE, POTASSIUM CHLORIDE, SODIUM LACTATE AND CALCIUM CHLORIDE: 600; 310; 30; 20 INJECTION, SOLUTION INTRAVENOUS at 09:16

## 2025-01-02 RX ADMIN — METHOCARBAMOL 500 MG: 100 INJECTION INTRAMUSCULAR; INTRAVENOUS at 10:07

## 2025-01-02 RX ADMIN — KETOROLAC TROMETHAMINE 30 MG: 30 INJECTION, SOLUTION INTRAMUSCULAR; INTRAVENOUS at 09:17

## 2025-01-02 RX ADMIN — CEFAZOLIN 2000 MG: 2 INJECTION, POWDER, FOR SOLUTION INTRAMUSCULAR; INTRAVENOUS at 07:59

## 2025-01-02 RX ADMIN — LIDOCAINE HYDROCHLORIDE 100 MG: 20 INJECTION, SOLUTION INTRAVENOUS at 07:52

## 2025-01-02 RX ADMIN — SUGAMMADEX 200 MG: 100 INJECTION, SOLUTION INTRAVENOUS at 09:19

## 2025-01-02 RX ADMIN — ACETAMINOPHEN 1000 MG: 500 TABLET ORAL at 07:21

## 2025-01-02 RX ADMIN — DEXMEDETOMIDINE 10 MCG: 100 INJECTION, SOLUTION INTRAVENOUS at 08:10

## 2025-01-02 RX ADMIN — HYDROMORPHONE HYDROCHLORIDE 1 MG: 1 INJECTION, SOLUTION INTRAMUSCULAR; INTRAVENOUS; SUBCUTANEOUS at 08:15

## 2025-01-02 RX ADMIN — ROCURONIUM BROMIDE 50 MG: 10 INJECTION, SOLUTION INTRAVENOUS at 07:52

## 2025-01-02 RX ADMIN — ONDANSETRON 4 MG: 2 INJECTION INTRAMUSCULAR; INTRAVENOUS at 10:07

## 2025-01-02 RX ADMIN — PROPOFOL 200 MG: 10 INJECTION, EMULSION INTRAVENOUS at 07:52

## 2025-01-02 RX ADMIN — MIDAZOLAM 2 MG: 1 INJECTION INTRAMUSCULAR; INTRAVENOUS at 07:45

## 2025-01-02 ASSESSMENT — ENCOUNTER SYMPTOMS
VOMITING: 0
ABDOMINAL DISTENTION: 0
CHOKING: 0
NAUSEA: 0
BLOOD IN STOOL: 0
ABDOMINAL PAIN: 0
TROUBLE SWALLOWING: 0
BACK PAIN: 0
SORE THROAT: 0
SHORTNESS OF BREATH: 0
STRIDOR: 0
COLOR CHANGE: 0
COUGH: 0

## 2025-01-02 ASSESSMENT — PAIN - FUNCTIONAL ASSESSMENT
PAIN_FUNCTIONAL_ASSESSMENT: ACTIVITIES ARE NOT PREVENTED
PAIN_FUNCTIONAL_ASSESSMENT: PREVENTS OR INTERFERES SOME ACTIVE ACTIVITIES AND ADLS
PAIN_FUNCTIONAL_ASSESSMENT: ACTIVITIES ARE NOT PREVENTED
PAIN_FUNCTIONAL_ASSESSMENT: PREVENTS OR INTERFERES SOME ACTIVE ACTIVITIES AND ADLS
PAIN_FUNCTIONAL_ASSESSMENT: PREVENTS OR INTERFERES SOME ACTIVE ACTIVITIES AND ADLS
PAIN_FUNCTIONAL_ASSESSMENT: 0-10
PAIN_FUNCTIONAL_ASSESSMENT: 0-10

## 2025-01-02 ASSESSMENT — PAIN DESCRIPTION - DESCRIPTORS
DESCRIPTORS: DISCOMFORT
DESCRIPTORS: ACHING;PRESSURE
DESCRIPTORS: ACHING;PRESSURE
DESCRIPTORS: DISCOMFORT
DESCRIPTORS: ACHING;PRESSURE
DESCRIPTORS: DISCOMFORT
DESCRIPTORS: ACHING

## 2025-01-02 ASSESSMENT — PAIN DESCRIPTION - FREQUENCY
FREQUENCY: CONTINUOUS

## 2025-01-02 ASSESSMENT — PAIN DESCRIPTION - ONSET
ONSET: ON-GOING

## 2025-01-02 ASSESSMENT — PAIN DESCRIPTION - ORIENTATION
ORIENTATION: MID

## 2025-01-02 ASSESSMENT — PAIN DESCRIPTION - LOCATION
LOCATION: ABDOMEN

## 2025-01-02 ASSESSMENT — PAIN DESCRIPTION - PAIN TYPE
TYPE: SURGICAL PAIN

## 2025-01-02 ASSESSMENT — PAIN SCALES - GENERAL
PAINLEVEL_OUTOF10: 5
PAINLEVEL_OUTOF10: 5
PAINLEVEL_OUTOF10: 7
PAINLEVEL_OUTOF10: 5
PAINLEVEL_OUTOF10: 6

## 2025-01-02 NOTE — ANESTHESIA POSTPROCEDURE EVALUATION
Department of Anesthesiology  Postprocedure Note    Patient: Rafy Castle  MRN: [id number]  YOB: 1985  Date of evaluation: 1/2/2025    Procedure Summary       Date: 01/02/25 Room / Location: [address]    Anesthesia Start: 0745 Anesthesia Stop: 0941    Procedure: HERNIA UMBILICAL REPAIR LAPAROSCOPIC ROBOTIC Diagnosis:       Umbilical hernia with obstruction, without gangrene      (Umbilical hernia with obstruction, without gangrene [K42.0])    Surgeons: Shar Ho DO Responsible Provider: Yandel Martinez MD    Anesthesia Type: General ASA Status: 2            Anesthesia Type: General    Nery Phase I: Nery Score: 10    Nery Phase II:      Anesthesia Post Evaluation    Patient location during evaluation: PACU  Patient participation: complete - patient participated  Level of consciousness: sleepy but conscious  Airway patency: patent  Nausea & Vomiting: no nausea and no vomiting  Cardiovascular status: hemodynamically stable  Respiratory status: acceptable, nasal cannula and spontaneous ventilation  Hydration status: stable  Pain management: adequate    No notable events documented.

## 2025-01-02 NOTE — DISCHARGE INSTRUCTIONS
Patient Discharge Instructions  Dr. Shar Ho  [address] [facility]  [address]  [place] [id number]  [phone]      Discharge Date:  1/2/2025    Discharged To:  Home      RESUME ACTIVITY:      BATHING:   OK to shower but no bath tub or submerging incision under water.    Incisions:    You have glue over your incisions, which will come off on its own in 1-2 weeks.  Keep wound dry and clean.  May shower as instructed above.    Dressing:    Wear abdominal binder while out of bed for 3 to 4 weeks after surgery.    DRIVING:   3-5 days. No driving until off narcotic pain medications and walking comfortably.    RETURN TO WORK: when cleared after your follow up office visit.    WALKING:    As tolerated.     STAIRS:    As tolerated.    LIFTING:   No lifting greater than 20 pounds for 6 weeks following surgery.    DIET:    Garden diet on day of surgery then regular diet.    Take stool softeners such as milk of magnesia or MiraLAX as needed to avoid constipation.    SPECIAL INSTRUCTIONS:     If you use a CPAP at home, continue to use it as normal.    Call the office at [phone]  if you have a fever greater than or equal to 101 F or if your incision becomes red, tender, or has drainage of pus.      If follow up appointment was not given to you, call the Surgical Clinic at [phone] for follow up appointment with Dr. Ho in:  1-2 weeks.

## 2025-01-02 NOTE — PROGRESS NOTES
0941- pt received from OR. Monitors placed and alarms on. Report received from Alfred CHIANG. Pt drowsy but arouses to name being called.  0958- pt resting comfortably with eyes closed. Arouses to name being called.  1007- pt medicated for complaints of pain and nausea.  1021- pt medicated for complaints of pain.  1035- pt resting comfortably. States pain is becoming tolerable. Denies any nausea.  1040- pt transported to [facility]  by RN and bedside report given to Sigifredo PADGETT.

## 2025-01-02 NOTE — H&P
SUBJECTIVE:  HPI:     Here fore umbilical hernia repair.     Past Surgical History:   Procedure Laterality Date    HAND DEBRIDEMENT  2016    washout    WISDOM TOOTH EXTRACTION       Past Medical History:   Diagnosis Date    Anxiety     Asthma     Chronic back pain     Migraine     Tick bite 2024    found a tick that was on him long enough to get blood. took it to health dept. waiting for results     Family History   Problem Relation Age of Onset    Breast Cancer Mother 64    Heart Disease Father     Hypertension Father     Cancer Maternal Grandmother         Lung    Heart Disease Maternal Grandfather     Diabetes Maternal Grandfather     Heart Surgery Maternal Grandfather     Dementia Paternal Grandmother     Diabetes Paternal Grandfather     Prostate Cancer Paternal Grandfather 89    Breast Cancer Maternal Aunt 70    Breast Cancer Maternal Aunt     Breast Cancer Paternal Aunt 55    Ovarian Cancer Neg Hx      Social History     Socioeconomic History    Marital status: Single     Spouse name: Not on file    Number of children: Not on file    Years of education: Not on file    Highest education level: Not on file   Occupational History    Not on file   Tobacco Use    Smoking status: Former     Current packs/day: 0.00     Average packs/day: 1 pack/day for 20.9 years (20.9 ttl pk-yrs)     Types: Cigarettes     Start date:      Quit date: 2023     Years since quittin.1    Smokeless tobacco: Not on file   Vaping Use    Vaping status: Never Used   Substance and Sexual Activity    Alcohol use: Yes     Alcohol/week: 4.0 standard drinks of alcohol     Types: 4 Cans of beer per week    Drug use: Yes     Frequency: 7.0 times per week     Types: Marijuana (Weed)     Comment: 24 0500    Sexual activity: Yes   Other Topics Concern    Not on file   Social History Narrative    Not on file     Social Determinants of Health     Financial Resource Strain: Not on file   Food Insecurity: Not on file

## 2025-01-02 NOTE — OP NOTE
Operative Note      Patient: Rafy Castle  YOB: 1985  MRN: [id number]    Date of Procedure: 1/2/2025    Pre-Op Diagnosis Codes:      * Umbilical hernia with obstruction, without gangrene [K42.0]    Post-Op Diagnosis: Same       Procedure(s):  HERNIA UMBILICAL REPAIR LAPAROSCOPIC ROBOTIC    Surgeon(s):  Shar Ho DO    Assistant:   First Assistant: Denise Juarez    Anesthesia: General    Estimated Blood Loss (mL): Minimal    Complications: None    Specimens:   * No specimens in log *    Implants:  Implant Name Type Inv. Item Serial No.  Lot No. LRB No. Used Action   MESH AKILAH [id number] POLYPR NONABSORBABLE SYN SQ PROL - [id number]  MESH AKILAH [id number] POLYPR NONABSORBABLE SYN SQ PROL  [id number] N/A 1 Implanted         Drains: * No LDAs found *    Findings:  Infection Present At Time Of Surgery (PATOS) (choose all levels that have infection present):  No infection present  Other Findings: Incarcerated umbilical hernia    Detailed Description of Procedure:     After induction of GETA patient was prepped and draped in usual sterile fashion.  Local anesthetic infiltrated for port sites.  Starting in left upper quadrant 5 mm camera/Visiport was used to access abdomen and pneumoperitoneum achieved to 15 mmHg.  Under visualization an 8 mm left lower quadrant and left 8 mm robotic trocar was placed and Visiport exchanged for an 8 mm robotic trocar.  Camera was used to direct left-sided tap block.  Robot was docked.  Preperitoneal plane was created starting on left side crossing midline and onto right side.  The umbilical hernia contained incarcerated preperitoneal fat.  There were several defects with total area close down measuring 5 cm.  A Prolene mesh as noted above was trimmed to a 15 x 12 cm oval and brought into the abdomen and placed in the preperitoneal plane and secured around the edges and along midline with interrupted 2-0 Vicryl sutures.  The preperitoneal

## 2025-01-02 NOTE — PROGRESS NOTES
1100 - care assumed  1115 - discharge instructions given to patient and family, understanding voiced without any further questions at this time  1122 - VSS, pain 5-6, denies nausea, binder dry  1125 - patient dressing  1130 - iv removed  1145 - patient left sds via wheelchair accompanied by nurse, patient safely in car

## 2025-01-03 ENCOUNTER — TELEPHONE (OUTPATIENT)
Dept: SURGERY | Age: 40
End: 2025-01-03

## 2025-01-03 NOTE — TELEPHONE ENCOUNTER
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     I called the pt today to see how they were doing post-operatively.  The pt's pain is well controlled with current pain control regimen.   Pt's incisions are doing well. Denies erythema, swelling or drainage.   Pt is tolerating eating without N/V, and is having bowel movements.   Is having quite a bit of pain, is taking narcotics, they are helping. Encouraged to take stool softeners with the narcotics to help constipation.  I reviewed the post-operative instructions, addressed any concerns and answered the patient's questions.     I confirmed the patient's post-op appointment on 1/15/2025        Aravind Delgado MA

## 2025-01-22 ENCOUNTER — OFFICE VISIT (OUTPATIENT)
Dept: SURGERY | Age: 40
End: 2025-01-22

## 2025-01-22 VITALS
HEIGHT: 72 IN | WEIGHT: 249 LBS | SYSTOLIC BLOOD PRESSURE: 120 MMHG | OXYGEN SATURATION: 99 % | DIASTOLIC BLOOD PRESSURE: 90 MMHG | HEART RATE: 94 BPM | BODY MASS INDEX: 33.72 KG/M2

## 2025-01-22 DIAGNOSIS — Z48.89 POSTOPERATIVE VISIT: Primary | ICD-10-CM

## 2025-02-05 NOTE — PROGRESS NOTES
SUBJECTIVE:  HPI:     Patient here to follow-up on umbilical hernia repair.  Pain improving.  No new complaints today.  Having normal bowel function.  Tolerating diet.  Denies shortness of breath, chest pain, fevers/chills, nausea/vomiting.    Past Surgical History:   Procedure Laterality Date    HAND DEBRIDEMENT      washout    UMBILICAL HERNIA REPAIR N/A 2025    HERNIA UMBILICAL REPAIR LAPAROSCOPIC ROBOTIC performed by Shar Ho DO at [facility] [place]    WISDOM TOOTH EXTRACTION       Past Medical History:   Diagnosis Date    Anxiety     Asthma     Chronic back pain     Migraine     Tick bite 2024    found a tick that was on him long enough to get blood. took it to health dept. waiting for results     Family History   Problem Relation Age of Onset    Breast Cancer Mother 64    Heart Disease Father     Hypertension Father     Cancer Maternal Grandmother         Lung    Heart Disease Maternal Grandfather     Diabetes Maternal Grandfather     Heart Surgery Maternal Grandfather     Dementia Paternal Grandmother     Diabetes Paternal Grandfather     Prostate Cancer Paternal Grandfather 89    Breast Cancer Maternal Aunt 70    Breast Cancer Maternal Aunt     Breast Cancer Paternal Aunt 55    Ovarian Cancer Neg Hx      Social History     Socioeconomic History    Marital status: Single     Spouse name: Not on file    Number of children: Not on file    Years of education: Not on file    Highest education level: Not on file   Occupational History    Not on file   Tobacco Use    Smoking status: Former     Current packs/day: 0.00     Average packs/day: 1 pack/day for 20.9 years (20.9 ttl pk-yrs)     Types: Cigarettes     Start date:      Quit date: 2023     Years since quittin.2    Smokeless tobacco: Not on file   Vaping Use    Vaping status: Never Used   Substance and Sexual Activity    Alcohol use: Yes     Alcohol/week: 4.0 standard drinks of alcohol     Types: 4 Cans of beer per week    Drug use:

## 2025-08-02 ENCOUNTER — HOSPITAL ENCOUNTER (EMERGENCY)
Age: 40
Discharge: HOME | End: 2025-08-02
Attending: EMERGENCY MEDICINE
Payer: COMMERCIAL

## 2025-08-02 ENCOUNTER — APPOINTMENT (OUTPATIENT)
Dept: GENERAL RADIOLOGY | Age: 40
End: 2025-08-02
Payer: COMMERCIAL

## 2025-08-02 ENCOUNTER — APPOINTMENT (OUTPATIENT)
Dept: CT IMAGING | Age: 40
End: 2025-08-02
Payer: COMMERCIAL

## 2025-08-02 VITALS
TEMPERATURE: 98.8 F | DIASTOLIC BLOOD PRESSURE: 87 MMHG | SYSTOLIC BLOOD PRESSURE: 153 MMHG | OXYGEN SATURATION: 96 % | HEIGHT: 72 IN | BODY MASS INDEX: 33.86 KG/M2 | WEIGHT: 250 LBS | RESPIRATION RATE: 16 BRPM | HEART RATE: 99 BPM

## 2025-08-02 DIAGNOSIS — S92.012A CLOSED DISPLACED FRACTURE OF BODY OF LEFT CALCANEUS, INITIAL ENCOUNTER: Primary | ICD-10-CM

## 2025-08-02 PROCEDURE — 29515 APPLICATION SHORT LEG SPLINT: CPT

## 2025-08-02 PROCEDURE — 96372 THER/PROPH/DIAG INJ SC/IM: CPT

## 2025-08-02 PROCEDURE — 6370000000 HC RX 637 (ALT 250 FOR IP): Performed by: EMERGENCY MEDICINE

## 2025-08-02 PROCEDURE — 6360000002 HC RX W HCPCS: Performed by: EMERGENCY MEDICINE

## 2025-08-02 PROCEDURE — 73630 X-RAY EXAM OF FOOT: CPT

## 2025-08-02 PROCEDURE — 73610 X-RAY EXAM OF ANKLE: CPT

## 2025-08-02 PROCEDURE — 99284 EMERGENCY DEPT VISIT MOD MDM: CPT

## 2025-08-02 PROCEDURE — 73700 CT LOWER EXTREMITY W/O DYE: CPT

## 2025-08-02 RX ORDER — OXYCODONE AND ACETAMINOPHEN 5; 325 MG/1; MG/1
1 TABLET ORAL EVERY 6 HOURS PRN
Qty: 20 TABLET | Refills: 0 | Status: SHIPPED | OUTPATIENT
Start: 2025-08-02 | End: 2025-08-07

## 2025-08-02 RX ORDER — OXYCODONE AND ACETAMINOPHEN 5; 325 MG/1; MG/1
2 TABLET ORAL ONCE
Refills: 0 | Status: COMPLETED | OUTPATIENT
Start: 2025-08-02 | End: 2025-08-02

## 2025-08-02 RX ORDER — KETOROLAC TROMETHAMINE 30 MG/ML
30 INJECTION, SOLUTION INTRAMUSCULAR; INTRAVENOUS ONCE
Status: DISCONTINUED | OUTPATIENT
Start: 2025-08-02 | End: 2025-08-02

## 2025-08-02 RX ORDER — NAPROXEN 500 MG/1
500 TABLET ORAL 2 TIMES DAILY WITH MEALS
Qty: 60 TABLET | Refills: 0 | Status: SHIPPED | OUTPATIENT
Start: 2025-08-02

## 2025-08-02 RX ORDER — VENLAFAXINE 37.5 MG/1
37.5 TABLET ORAL 2 TIMES DAILY WITH MEALS
COMMUNITY
Start: 2025-06-18

## 2025-08-02 RX ORDER — KETOROLAC TROMETHAMINE 30 MG/ML
30 INJECTION, SOLUTION INTRAMUSCULAR; INTRAVENOUS ONCE
Status: COMPLETED | OUTPATIENT
Start: 2025-08-02 | End: 2025-08-02

## 2025-08-02 RX ADMIN — OXYCODONE AND ACETAMINOPHEN 2 TABLET: 5; 325 TABLET ORAL at 20:12

## 2025-08-02 RX ADMIN — KETOROLAC TROMETHAMINE 30 MG: 30 INJECTION, SOLUTION INTRAMUSCULAR; INTRAVENOUS at 20:13

## 2025-08-02 ASSESSMENT — PAIN DESCRIPTION - DESCRIPTORS
DESCRIPTORS: SHOOTING;SHARP
DESCRIPTORS: THROBBING

## 2025-08-02 ASSESSMENT — PAIN SCALES - GENERAL
PAINLEVEL_OUTOF10: 10

## 2025-08-02 ASSESSMENT — PAIN DESCRIPTION - ORIENTATION
ORIENTATION: LEFT
ORIENTATION: LEFT

## 2025-08-02 ASSESSMENT — PAIN - FUNCTIONAL ASSESSMENT
PAIN_FUNCTIONAL_ASSESSMENT: 0-10
PAIN_FUNCTIONAL_ASSESSMENT: PREVENTS OR INTERFERES SOME ACTIVE ACTIVITIES AND ADLS
PAIN_FUNCTIONAL_ASSESSMENT: PREVENTS OR INTERFERES WITH ALL ACTIVE AND SOME PASSIVE ACTIVITIES

## 2025-08-02 ASSESSMENT — PAIN DESCRIPTION - LOCATION
LOCATION: FOOT
LOCATION: ANKLE

## 2025-08-02 ASSESSMENT — PAIN DESCRIPTION - PAIN TYPE: TYPE: ACUTE PAIN

## 2025-08-02 NOTE — ED NOTES
Pt reports he was drinking last night and states he woke up in and couldn't put weight on his left ankle c/o pain swelling and bruising  Pt is alert and oriented and states he took 4 ibuprofen about an hour PTA   Pt was brought in wheelchair to room 3

## 2025-08-02 NOTE — ED PROVIDER NOTES
Emergency Department Encounter  Location: [facility] EMERGENCY DEPARTMENT    Patient: Rafy Castle  MRN: [id number]  : 1985  Date of evaluation: 2025  ED Provider: Kalina Manzo DO    Chief Complaint:    Ankle Pain (Left ankle injury last night while he was drinking unsure of how he injured it )    [place]:  Rafy Castle is a 40 y.o. male that presents to the emergency department with concern for left ankle pain.  Patient describes he was drinking heavily last night and woke up in a patch of grass.  States he has not been able to bear weight on the ankle despite taking ibuprofen prior to arrival.   Does not think he hit his head and denies current headache or vomiting this morning.  No neck or back pain.      Past Medical History:   Diagnosis Date    Anxiety     Asthma     Chronic back pain     Migraine     Tick bite 2024    found a tick that was on him long enough to get blood. took it to health dept. waiting for results     Past Surgical History:   Procedure Laterality Date    HAND DEBRIDEMENT      washout    UMBILICAL HERNIA REPAIR N/A 2025    HERNIA UMBILICAL REPAIR LAPAROSCOPIC ROBOTIC performed by Shar Ho DO at [facility] OR    WISDOM TOOTH EXTRACTION       Family History   Problem Relation Age of Onset    Breast Cancer Mother 64    Heart Disease Father     Hypertension Father     Cancer Maternal Grandmother         Lung    Heart Disease Maternal Grandfather     Diabetes Maternal Grandfather     Heart Surgery Maternal Grandfather     Dementia Paternal Grandmother     Diabetes Paternal Grandfather     Prostate Cancer Paternal Grandfather 89    Breast Cancer Maternal Aunt 70    Breast Cancer Maternal Aunt     Breast Cancer Paternal Aunt 55    Ovarian Cancer Neg Hx      Social History     Socioeconomic History    Marital status: Single     Spouse name: Not on file    Number of children: Not on file    Years of education: Not on file    Highest education level: Not  Exam:  ED Triage Vitals [08/02/25 1804]   Encounter Vitals Group      BP (!) 153/87      Systolic BP Percentile       Diastolic BP Percentile       Pulse 99      Respirations 16      Temp 98.8 °F (37.1 °C)      Temp Source Temporal      SpO2 96 %      Weight - Scale 113.4 kg (250 lb)      Height 1.829 m (6')      Head Circumference       Peak Flow       Pain Score       Pain Loc       Pain Education       Exclude from Growth Chart      GENERAL APPEARANCE: Awake and alert. Cooperative. No acute distress.   HEAD: Normocephalic. Atraumatic.   EYES: EOM's grossly intact. Sclera anicteric.   ENT: Tolerates saliva. No trismus.   NECK: Supple. Trachea midline.  No midline C-spine tenderness.  CARDIO: RRR. Radial pulse 2+.   LUNGS: Respirations unlabored. CTAB.  ABDOMEN: Soft. Non-distended. Non-tender.    EXTREMITIES: No acute deformities.  Patient with ecchymosis, tenderness and moderate edema involving the lateral malleolus of the left ankle.  He also has tenderness over the sole of the foot, primarily involving the proximal metatarsals.  Minimal tenderness over the medial malleolus.  Remaining foot, lower leg and knee are nontender.  No other areas of bony tenderness or  SKIN: Warm and dry.   NEUROLOGICAL: Speech and mentation are clear.  No gross facial drooping. Moves all 4 extremities spontaneously.  Intact strength and sensation in upper and lower extremities.    Labs:  No results found for this visit on 08/02/25.    Radiographs (if obtained):  [] The following radiograph was interpreted by myself in the absence of a radiologist:  [x] Radiologist's Report reviewed at time of ED visit:  ***    CC/HPI Summary, DDx, ED Course, and Reassessment: ***    ED Course as of 08/02/25 1851   Sat Aug 02, 2025   1850 X-rays reviewed.  CT added to workup based on concern for calcaneal fracture as this correlates with his area of tenderness [JG]      ED Course User Index  [JG] Kalina Manzo,        History from :

## 2025-08-02 NOTE — DISCHARGE INSTRUCTIONS
Providers Welcoming New Patients:      [facility]    Daren Peguero, APRN-CNP  [address] [place] [address]  ?? [phone]    Marcia West, APRN-CNP  [address] [place] [address]  ?? [phone]    Robin Smith M.D.  [address] [place] [address] [place] [address]  ?? [phone]    Keturah Beltran, APRN-CNP  Joanna Levy, APRN-CNP  [address], [place] [address]  ?? [phone]    Genie Garrett M.D.  Julio Fung M.D.   ZAINAB Joyce, APRN-CNP  [address], [place] [address]  ?? [phone]    Leandro Hunter M.D.  [address], [place] [address]  ?? [phone]    Marizol Rivera, APRN-CNP  [address], [place] [address]  ?? [phone]        [facility]    Mikaela Dias M.D.  Jacquie Mao NP  [address] [place] [address]  ?? [phone]    Beatriz Stallworth, APRN-CNP - Pediatrics only  [address], [place] [address]  ?? [phone]    Camila Arroyo M.D.  Reji Goldstein M.D.  [address], [place] [address]  ?? [phone]    James Guillen, APRN-CNP  [address], [place] [address]  ?? [phone]      ?? Please note that new patient appointment wait times may vary among providers.      Please go to [employer] or call [phone] to find a new provider.  You can also use the QR code below:          ++++++++++++++++++++++++++++++++++++++++++++++++++++++++++++++++++++      For Acute Care Needs or Prescription Refills Before Your New Patient Appointment:    [facility]  [address], [place] [address]  ?? [phone]    [facility]  [address] [address], [place] [address]  ?? [phone]

## 2025-08-03 NOTE — ED PROVIDER NOTES
Rafy Castle was checked out to me by Dr. Manzo. Please see his/her initial documentation for details of the patient's ED presentation, physical exam and completed studies.    In brief, Rafy Castle is a 40 y.o. male that presents with left foot and ankle pain and swelling.  Patient reports that he was drinking heavily last night does not exactly recall what happened.  Patient has not been able to bear weight to his left foot all day today.    Labs  No results found for this visit on 08/02/25.    CT FOOT LEFT WO CONTRAST  Result Date: 8/2/2025  REASON FOR EXAM suspected calcaneal fx Study: CT left foot 8/2/2025 at 1928 hours. COMPARISON: Foot plain film series 8/2/2025. PROCEDURE: Axial imaging of the foot was acquired at 2 mm with additional sagittal and coronal reconstructed images included. CT radiation dose optimization techniques including automated exposure control and use of iterative reconstruction techniques or adjustment of the mA and or kV according to patient size were used to limit the patient's radiation dose. Findings: Initial review of the metatarsals and distal digits show no evidence of acute fractures or dislocations. The metatarsal bones also appear intact with no fractures or dislocations evident. The distal fibula and tibia show no evidence of fractures. Talus appears intact. The calcaneus shows a comminuted fracture vertically crossing the waist of the calcaneus and mid level. The fracture resides just posterior to the posterior talar articular surface. Alignment is predominantly maintained. A second fracture line extends horizontally and involves the sustentaculum lorna the fracture extends into the sinus Tarsi region. IMPRESSION: Comminuted calcaneal type III fracture.  Dictated and Electronically Signed By: Martin Sawant [facility] 8/2/2025 20:08        XR FOOT LEFT (MIN 3 VIEWS)  Result Date: 8/2/2025  REASON FOR EXAM trauma Study: Left foot 3 view series

## (undated) DEVICE — COLUMN DRAPE

## (undated) DEVICE — Device

## (undated) DEVICE — STRIP SKIN CLSR W0.25XL4IN WHT SPUNBOUND FBR NYL HI ADH

## (undated) DEVICE — SUTURE VICRYL + SZ 4-0 L27IN ABSRB WHT FS-2 3/8 CIR REV CUT VCP422H

## (undated) DEVICE — SUTURE VICRYL SZ 2-0 L27IN ABSRB VLT L26MM SH 1/2 CIR J317H

## (undated) DEVICE — SYRINGE MED 20ML STD CLR PLAS LUERLOCK TIP N CTRL DISP

## (undated) DEVICE — ARM DRAPE

## (undated) DEVICE — TOWEL,OR,DSP,ST,BLUE,STD,6/PK,12PK/CS: Brand: MEDLINE

## (undated) DEVICE — BINDER ABD UNISX 9IN 62IN L AND XL UNIV

## (undated) DEVICE — TOTAL TRAY, DB, 100% SILI FOLEY, 16FR 10: Brand: MEDLINE

## (undated) DEVICE — REDUCER: Brand: ENDOWRIST

## (undated) DEVICE — Z INACTIVE NO ACTIVE SUPPLIER APPLICATOR MEDICATED 26 CC TINT HI-LITE ORNG STRL CHLORAPREP

## (undated) DEVICE — SUTURE 1 STRATAFIX SYMMETRIC PDS + 30CM CT-1 SXPP1A435

## (undated) DEVICE — Z DISCONTINUED USE 2764362 SEAL ENDOSCP INSTR DIA5-8MM UNIV FOR CANN DA VINCI XI

## (undated) DEVICE — EXCEL 10FT (3.05 M) INSUFFLATION TUBING SET WITH 0.1 MICRON FILTER: Brand: EXCEL

## (undated) DEVICE — POSITIONER,HEAD,RING CUSHION,9IN,32CS: Brand: MEDLINE

## (undated) DEVICE — TROCAR: Brand: KII FIOS FIRST ENTRY

## (undated) DEVICE — POSITIONER HD AD W4.5XH8XL9IN HIGHLY RESILIENT FOAM CMFRT

## (undated) DEVICE — SUTURE STRATAFIX SZ 3-0 L20CM ABSRB VLT NDL SH-1 L22MM 1/2 SXPP1B453

## (undated) DEVICE — TISSUE RETRIEVAL SYSTEM: Brand: INZII RETRIEVAL SYSTEM

## (undated) DEVICE — SEAL

## (undated) DEVICE — GOWN,SIRUS,POLYRNF,BRTHSLV,XLN/XL,20/CS: Brand: MEDLINE

## (undated) DEVICE — Z INACTIVE USE 2660663 SOLUTION IRRIG 1000ML STRIL H2O USP PLAS POUR BTL

## (undated) DEVICE — KNIFE SURG 15 DEG STBL BLADE

## (undated) DEVICE — ELECTRODE ES AD CRDLSS PT RET REM POLYHESIVE

## (undated) DEVICE — ADHESIVE SKIN CLSR 0.7ML TOP DERMBND ADV

## (undated) DEVICE — GLOVE ORANGE PI 8   MSG9080

## (undated) DEVICE — Z DISCONTINUED USE 2220193 SUTURE VCRL SZ 4-0 L27IN ABSRB UD L19MM FS-2 3/8 CIR REV J422H

## (undated) DEVICE — LINER,SEMI-RIGID,3000CC,50EA/CS: Brand: MEDLINE

## (undated) DEVICE — TIP COVER ACCESSORY

## (undated) DEVICE — NEEDLE HYPO 20GA L1.5IN YEL POLYPR HUB S STL REG BVL STR